# Patient Record
Sex: FEMALE | Race: OTHER | HISPANIC OR LATINO | URBAN - METROPOLITAN AREA
[De-identification: names, ages, dates, MRNs, and addresses within clinical notes are randomized per-mention and may not be internally consistent; named-entity substitution may affect disease eponyms.]

---

## 2017-09-05 ENCOUNTER — EMERGENCY (EMERGENCY)
Facility: HOSPITAL | Age: 57
LOS: 1 days | Discharge: HOME | End: 2017-09-05
Attending: EMERGENCY MEDICINE | Admitting: EMERGENCY MEDICINE
Payer: COMMERCIAL

## 2017-09-05 VITALS
SYSTOLIC BLOOD PRESSURE: 131 MMHG | OXYGEN SATURATION: 97 % | DIASTOLIC BLOOD PRESSURE: 74 MMHG | TEMPERATURE: 99 F | RESPIRATION RATE: 16 BRPM | HEART RATE: 57 BPM

## 2017-09-05 PROCEDURE — 99284 EMERGENCY DEPT VISIT MOD MDM: CPT | Mod: 25

## 2017-09-05 RX ORDER — ATENOLOL 25 MG/1
0 TABLET ORAL
Qty: 0 | Refills: 0 | COMMUNITY

## 2017-09-05 NOTE — ED ADULT NURSE NOTE - OBJECTIVE STATEMENT
58 y/o female walked into ED a&ox3 c/o back pain. Patient states she was giving her nephew a bath, picked him up twisted to the right and had sudden sharp back pain 10/10. States pain has been constant ever since. Patient took Tylenol at around 8pm but felt no relief. Denies N/V, SOB, CP, abd pain, dizziness, weakness or any other complaints at this time. +tenderness with palpation of right side/lower back. Lungs clear b/l. Patient resting in bed awaiting MD [place]. Safety and comfort maintained.

## 2017-09-06 VITALS
RESPIRATION RATE: 16 BRPM | HEART RATE: 52 BPM | DIASTOLIC BLOOD PRESSURE: 84 MMHG | OXYGEN SATURATION: 99 % | TEMPERATURE: 98 F | SYSTOLIC BLOOD PRESSURE: 133 MMHG

## 2017-09-06 PROCEDURE — 99283 EMERGENCY DEPT VISIT LOW MDM: CPT | Mod: 25

## 2017-09-06 PROCEDURE — 96372 THER/PROPH/DIAG INJ SC/IM: CPT

## 2017-09-06 RX ORDER — DIAZEPAM 5 MG
1 TABLET ORAL
Qty: 9 | Refills: 0 | OUTPATIENT
Start: 2017-09-06 | End: 2017-09-09

## 2017-09-06 RX ORDER — DIAZEPAM 5 MG
5 TABLET ORAL ONCE
Qty: 0 | Refills: 0 | Status: DISCONTINUED | OUTPATIENT
Start: 2017-09-06 | End: 2017-09-06

## 2017-09-06 RX ORDER — KETOROLAC TROMETHAMINE 30 MG/ML
30 SYRINGE (ML) INJECTION ONCE
Qty: 0 | Refills: 0 | Status: DISCONTINUED | OUTPATIENT
Start: 2017-09-06 | End: 2017-09-06

## 2017-09-06 RX ADMIN — Medication 30 MILLIGRAM(S): at 02:11

## 2017-09-06 RX ADMIN — Medication 5 MILLIGRAM(S): at 03:29

## 2017-09-06 NOTE — ED PROVIDER NOTE - MUSCULOSKELETAL MINIMAL EXAM
R sided lower paraspinal tenderness, no midline tenderness. full ROM of lwoer extremities, strengfth and sensation intact.

## 2017-09-06 NOTE — ED PROVIDER NOTE - PROGRESS NOTE DETAILS
Morad: moderate relief of symptoms. Will d.c with motrin and valium. No immediate life threatening issues present on history or clinical exam. Patient is a safe disposition home, has capacity and insight into their condition, ambulatory in the emergency department and will follow up with their doctor(s) this week. Patient and family  understand anticipatory guidance were given strict return and follow up precautions.  The patient and family have been informed of all concerning signs and symptoms to return to Emergency Department, the necessity to follow up with PMD/Clinic/follow up provided within 2-3 days was explained, and the patient and/or family reports understanding of above with capacity and insight.

## 2017-09-06 NOTE — ED PROVIDER NOTE - OBJECTIVE STATEMENT
57 F h/o arthritis, htn, with R sided lower back pain today. Was bending down lifting her grandson then twisted back and felt sudden onset of pain. No numbness/ weakness in lower extremities. took tylenol at home with no relief. No urinary or stool incontinence. No hematuria.

## 2017-09-06 NOTE — ED PROVIDER NOTE - ATTENDING CONTRIBUTION TO CARE
No bowel or bladder dysfunction, no sensory or motor loss in legs, no recent back surgeries or injections, no history of intravenous drug abuse, no fevers, chills or night sweats. No saddle numbness with wiping or self exam.   mild distress secondary to pain, pain with sitting up and moving, neg radicular symptoms, normal sensation to saddle region, neg slt bilaterally, cooperative, with capacity and insight, alert, trachea midline, no rashes, non-tachypneic, non-tachycardic   will treat pain, reassess, and discharge with analgesia and spine  follow up.

## 2020-02-20 NOTE — ED ADULT NURSE NOTE - PAIN: PRESENCE, MLM
complains of pain/discomfort Closure 2 Information: This tab is for additional flaps and grafts, including complex repair and grafts and complex repair and flaps. You can also specify a different location for the additional defect, if the location is the same you do not need to select a new one. We will insert the automated text for the repair you select below just as we do for solitary flaps and grafts. Please note that at this time if you select a location with a different insurance zone you will need to override the ICD10 and CPT if appropriate.

## 2024-03-18 NOTE — ED ADULT TRIAGE NOTE - CCCP TRG CHIEF CMPLNT
back pain general
PAST SURGICAL HISTORY:  H/O carpal tunnel repair     H/O gastric bypass     H/O hernia repair     S/P appendectomy     S/P cholecystectomy     S/P trigger finger release